# Patient Record
Sex: MALE
[De-identification: names, ages, dates, MRNs, and addresses within clinical notes are randomized per-mention and may not be internally consistent; named-entity substitution may affect disease eponyms.]

---

## 2018-01-15 ENCOUNTER — HOSPITAL ENCOUNTER (EMERGENCY)
Dept: HOSPITAL 42 - ED | Age: 53
LOS: 1 days | Discharge: HOME | End: 2018-01-16
Payer: COMMERCIAL

## 2018-01-15 VITALS — BODY MASS INDEX: 49.4 KG/M2

## 2018-01-15 VITALS — TEMPERATURE: 98 F

## 2018-01-15 DIAGNOSIS — T58.8X1A: Primary | ICD-10-CM
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
## 2018-01-16 VITALS
OXYGEN SATURATION: 98 % | SYSTOLIC BLOOD PRESSURE: 152 MMHG | HEART RATE: 82 BPM | DIASTOLIC BLOOD PRESSURE: 82 MMHG | RESPIRATION RATE: 17 BRPM

## 2018-01-16 LAB
BASE EXCESS BLDV CALC-SCNC: 1.9 MMOL/L (ref 0–2)
PH BLDV: 7.38 [PH] (ref 7.32–7.43)
VENOUS BLOOD GAS PCO2: 47 (ref 40–60)
VENOUS BLOOD GAS PO2: 200 MM/HG (ref 30–55)

## 2018-01-16 NOTE — ED PDOC
Arrival/HPI





- General


Chief Complaint: Headache


Time Seen by Provider: 01/15/18 23:01


Historian: Patient





- History of Present Illness


Narrative History of Present Illness (Text): 


01/16/18 23:06


A 52 year old male, whose past medical history includes gout, presents to the 

emergency department complaining of nausea. Patient reports he lives in 

basement of building and installed alarm which has CO monitor. As soon as he 

plugged in monitor, the alarm went off. [facility] arrived and stated CO level was 

high. Patient had likely inhaled carbon monoxide, causing nausea symptom. 

Patient has no other complaints. 





No PMD








Past Medical History





- Provider Review


Nursing Documentation Reviewed: Yes





- Infectious Disease


Hx of Infectious Diseases: None





- Cardiac


Hx Cardiac Disorders: No





- Pulmonary


Hx Respiratory Disorders: No





- Endocrine/Metabolic


Hx Diabetes Mellitus Type 2: Yes





- Musculoskeletal/Rheumatological


Hx Falls: No


Hx Gout: Yes





- Psychiatric


Hx Depression: Yes


Hx Substance Use: No





- Surgical History


Other/Comment: repair of rectal fistula 2013





- Anesthesia


Hx Anesthesia: Yes


Hx Anesthesia Reactions: No


Hx Malignant Hyperthermia: No





Family/Social History





- Physician Review


Nursing Documentation Reviewed: Yes


Family/Social History: No Known Family HX


Smoking Status: Never Smoked


Hx Alcohol Use: No


Hx Substance Use: No





Allergies/Home Meds


Allergies/Adverse Reactions: 


Allergies





mushroom Adverse Reaction (Verified 03/12/17 07:57)


 SHORTNESS OF BREATH








Home Medications: 


 Home Meds











 Medication  Instructions  Recorded  Confirmed


 


Allopurinol [Zyloprim] 1 tab PO DAILY 01/15/18 01/15/18


 


LORazepam Half Tablet [Ativan] 1 tab PO PRN PRN 01/15/18 01/15/18


 


Lithium Carbonate [Lithium 900 mg PO HS 01/15/18 01/15/18





Carbonate 300MG]   


 


Naproxen [Naprosyn] 1 tab PO PRN PRN 01/15/18 01/15/18


 


Sertraline HCl [Zoloft] 200 mg PO DAILY 01/15/18 01/15/18


 


lamoTRIgine [Lamictal] 1 tab PO DAILY 01/15/18 01/15/18














Review of Systems





- Physician Review


All systems were reviewed & negative as marked: Yes





- Review of Systems


Constitutional: absent: Fevers


Respiratory: absent: SOB


Cardiovascular: absent: Chest Pain


Gastrointestinal: Nausea (likely from inhaling CO).  absent: Vomiting


Neurological: absent: Headache, Dizziness





Physical Exam


Vital Signs Reviewed: Yes


Vital Signs











  Temp Pulse Resp BP Pulse Ox


 


 01/16/18 01:46  98.0 F  82  17  152/82 H  98


 


 01/15/18 23:09  98.0 F  87  18  163/88 H  95











Temperature: Afebrile


Blood Pressure: Hypertensive


Pulse: Regular


Respiratory Rate: Normal


Appearance: Positive for: Well-Appearing


Pain Distress: None


Mental Status: Positive for: Alert and Oriented X 3





- Systems Exam


Head: Present: Atraumatic, Normocephalic


Pupils: Present: PERRL


Extroacular Muscles: Present: EOMI


Conjunctiva: Present: Normal


Mouth: Present: Moist Mucous Membranes


Neck: Present: Normal Range of Motion


Respiratory/Chest: Present: Clear to Auscultation, Good Air Exchange.  No: 

Respiratory Distress, Accessory Muscle Use


Cardiovascular: Present: Regular Rate and Rhythm, Normal S1, S2.  No: Murmurs


Abdomen: Present: Normal Bowel Sounds.  No: Tenderness, Distention, Peritoneal 

Signs


Back: Present: Normal Inspection


Upper Extremity: Present: Normal Inspection.  No: Cyanosis, Edema


Lower Extremity: Present: Normal Inspection.  No: Edema


Neurological: Present: GCS=15, CN II-XII Intact, Speech Normal


Skin: Present: Warm, Dry, Normal Color.  No: Rashes


Psychiatric: Present: Alert, Oriented x 3, Normal Insight, Normal Concentration





Medical Decision Making


ED Course and Treatment: 


01/16/18 23:10


Impression: 52 year old male with nausea after likely inhaling CO. Physical 

exam is unremarkable.





Plan:


-- Reassess and disposition





Prior Visits:


Notes and results from previous visits were reviewed. Patient was last seen in 

the emergency department on 03/12/2017 for atraumatic right ankle pain. Patient 

was admitted.





Progress Notes:











- Lab Interpretations


Lab Results: 


 Lab Results





01/16/18 00:08: pO2 200 H, ABG Carboxyhemoglobin 2.3 H, POC ABG HHb (Measured) 

0.1, ABG Methemoglobin 0.7, VBG pH 7.38, VBG pCO2 47.0, VBG HCO3 27.8, VBG O2 

Sat (Calc) 99.9 H, VBG Base Excess 1.9, VBG Hgb O2 Saturation 96.9, Hemoglobin 

14.3











- Scribe Statement


The provider has reviewed the documentation as recorded by the Prasanna Amezcua





Provider Scribe Attestation:


All medical record entries made by the Scribe were at my direction and 

personally dictated by me. I have reviewed the chart and agree that the record 

accurately reflects my personal performance of the history, physical exam, 

medical decision making, and the department course for this patient. I have 

also personally directed, reviewed, and agree with the discharge instructions 

and disposition.








Disposition/Present on Arrival





- Present on Arrival


Any Indicators Present on Arrival: No


History of DVT/PE: No


History of Uncontrolled Diabetes: No


Urinary Catheter: No


History of Decub. Ulcer: No


History Surgical Site Infection Following: None





- Disposition


Have Diagnosis and Disposition been Completed?: Yes


Diagnosis: 


 Carbon monoxide exposure





Disposition: HOME/ ROUTINE


Disposition Time: 00:30


Condition: GOOD


Discharge Instructions (ExitCare):  Carbon Monoxide Poisoning (ED)


Additional Instructions: 





Thank you for letting us take care of you today. The emergency medical care you 

received today was directed at your acute symptoms. If you were prescribed any 

medication, please fill it and take as directed. It may take several days for 

your symptoms to resolve. Return to the Emergency Department if your symptoms 

worsen, do not improve, or if you have any other problems.





Please contact your doctor or call one of the physicians/clinics you have been 

referred to that are listed on the Patient Visit Information form that is 

included in your discharge packet. Bring any paperwork you were given at 

discharge with you along with any medications you are taking to your follow up 

visit. Our treatment cannot replace ongoing medical care by a primary care 

provider (PCP) outside of the emergency department.





Thank you for allowing the [facility] team to be part of your care today.

















Follow up with your doctor in 2-3 days for re-evaluation and further management.


Referrals: 


[facility], [Non-Staff] - Follow up with primary


Forms:  [employer] (English)

## 2019-03-26 ENCOUNTER — HOSPITAL ENCOUNTER (OUTPATIENT)
Dept: HOSPITAL 31 - C.ENDO | Age: 54
Discharge: HOME | End: 2019-03-26
Attending: INTERNAL MEDICINE
Payer: COMMERCIAL

## 2019-03-26 VITALS — DIASTOLIC BLOOD PRESSURE: 69 MMHG | SYSTOLIC BLOOD PRESSURE: 145 MMHG

## 2019-03-26 VITALS — OXYGEN SATURATION: 98 % | HEART RATE: 72 BPM | TEMPERATURE: 98.9 F | RESPIRATION RATE: 20 BRPM

## 2019-03-26 VITALS — BODY MASS INDEX: 50.1 KG/M2

## 2019-03-26 DIAGNOSIS — R10.13: Primary | ICD-10-CM

## 2019-03-26 DIAGNOSIS — Z80.0: ICD-10-CM

## 2019-03-26 DIAGNOSIS — K20.9: ICD-10-CM

## 2019-03-26 DIAGNOSIS — R12: ICD-10-CM

## 2019-03-26 PROCEDURE — 43239 EGD BIOPSY SINGLE/MULTIPLE: CPT

## 2019-03-26 PROCEDURE — 82948 REAGENT STRIP/BLOOD GLUCOSE: CPT

## 2019-03-26 PROCEDURE — 88305 TISSUE EXAM BY PATHOLOGIST: CPT

## 2019-03-26 PROCEDURE — 88313 SPECIAL STAINS GROUP 2: CPT

## 2019-03-26 PROCEDURE — 88342 IMHCHEM/IMCYTCHM 1ST ANTB: CPT

## 2019-03-26 PROCEDURE — 88312 SPECIAL STAINS GROUP 1: CPT

## 2019-03-26 NOTE — CP.SDSHP
Same Day Surgery H & P





- History


Proposed Procedure: egd


Pre-Op Diagnosis: epigastric pain.  heartburn





- Previous Medical/Surgical History


Cardiac: Hypertension


Endocrine/Metabolic: Diabetes, Obesity


Neuro: Backaches


Misc: Other (Bipolar disorder, DJD, Gout)


Previous Surgical History: Rectal fistula.  Eye surgery.  T and A





- Allergies


Allergies: 


Allergies





mushroom Adverse Reaction (Severe, Verified 03/26/19 09:56)


   ANAPHYLAXIS











- Physical Exam


Vital Signs: 


                                   Vital Signs











  03/26/19





  09:15


 


Temperature 98.9 F


 


Pulse Rate 72


 


Respiratory 20





Rate 


 


Blood Pressure 142/79


 


O2 Sat by Pulse 98





Oximetry 











Mental Status: Alert & Oriented x3


Neuro: Other (Anxious)


Heart: WNL


Lungs: WNL


GI: WNL





- Impression


Impression: epigastric pain.  heartburn


Pt. Evaluated Today:Candidate for Anesthesia & Procedure: Yes





- Date & Time


Date: 03/26/19


Time: 11:35





Short Stay Discharge





- Short Stay Discharge


Admitting Diagnosis/Reason for Visit: EPIGASTRIC PAIN / HEARTBURN


Disposition: HOME/ ROUTINE